# Patient Record
Sex: FEMALE | URBAN - METROPOLITAN AREA
[De-identification: names, ages, dates, MRNs, and addresses within clinical notes are randomized per-mention and may not be internally consistent; named-entity substitution may affect disease eponyms.]

---

## 2019-01-16 VITALS
SYSTOLIC BLOOD PRESSURE: 139 MMHG | DIASTOLIC BLOOD PRESSURE: 76 MMHG | HEIGHT: 64 IN | TEMPERATURE: 96 F | WEIGHT: 232.81 LBS | OXYGEN SATURATION: 96 % | RESPIRATION RATE: 18 BRPM | HEART RATE: 103 BPM

## 2019-01-17 ENCOUNTER — INPATIENT (INPATIENT)
Facility: HOSPITAL | Age: 24
LOS: 0 days | Discharge: HOME | DRG: 743 | End: 2019-01-18
Attending: OBSTETRICS & GYNECOLOGY | Admitting: OBSTETRICS & GYNECOLOGY
Payer: COMMERCIAL

## 2019-01-17 DIAGNOSIS — Z41.9 ENCOUNTER FOR PROCEDURE FOR PURPOSES OTHER THAN REMEDYING HEALTH STATE, UNSPECIFIED: Chronic | ICD-10-CM

## 2019-01-17 RX ORDER — TRAMADOL HYDROCHLORIDE 50 MG/1
25 TABLET ORAL EVERY 6 HOURS
Qty: 0 | Refills: 0 | Status: DISCONTINUED | OUTPATIENT
Start: 2019-01-17 | End: 2019-01-18

## 2019-01-17 RX ORDER — HYDROMORPHONE HYDROCHLORIDE 2 MG/ML
0.5 INJECTION INTRAMUSCULAR; INTRAVENOUS; SUBCUTANEOUS
Qty: 0 | Refills: 0 | Status: DISCONTINUED | OUTPATIENT
Start: 2019-01-17 | End: 2019-01-17

## 2019-01-17 RX ORDER — ENOXAPARIN SODIUM 100 MG/ML
40 INJECTION SUBCUTANEOUS DAILY
Qty: 0 | Refills: 0 | Status: DISCONTINUED | OUTPATIENT
Start: 2019-01-18 | End: 2019-01-18

## 2019-01-17 RX ORDER — ACETAMINOPHEN 500 MG
1000 TABLET ORAL EVERY 8 HOURS
Qty: 0 | Refills: 0 | Status: DISCONTINUED | OUTPATIENT
Start: 2019-01-17 | End: 2019-01-18

## 2019-01-17 RX ORDER — IBUPROFEN 200 MG
600 TABLET ORAL EVERY 6 HOURS
Qty: 0 | Refills: 0 | Status: DISCONTINUED | OUTPATIENT
Start: 2019-01-17 | End: 2019-01-18

## 2019-01-17 RX ORDER — SODIUM CHLORIDE 9 MG/ML
1000 INJECTION, SOLUTION INTRAVENOUS
Qty: 0 | Refills: 0 | Status: DISCONTINUED | OUTPATIENT
Start: 2019-01-17 | End: 2019-01-18

## 2019-01-17 RX ADMIN — Medication 1000 MILLIGRAM(S): at 23:41

## 2019-01-17 RX ADMIN — Medication 1000 MILLIGRAM(S): at 22:44

## 2019-01-17 NOTE — PROGRESS NOTE ADULT - ASSESSMENT
23y Female POD#0 s/p laparoscopic excision of endometriosis and appendectomy, doing well.    1. Neuro/Pain:  Tylenol, Motrin, Tramadol prn  2  CV:   VS per routine  3. Pulm: Encourage ISS  4. GI: ADAT  5. :  Lockett in place, TOV in AM after suspensions removed  6. Heme: AM CBC  7. ID: --  8. DVT ppx: SCDs, lovenox in AM  9. Dispo: Likely POD#1

## 2019-01-17 NOTE — PACU DISCHARGE NOTE - COMMENTS
pt aao x3.  VSS. lap sites to abd with 2x2 to umbilicus and right abd c/d/i; steri strips to suprapubic area and left abd c/d/i. b/l ovarian suspension in place. kwan to bsd.  denies c/o pain.  report given to RN on 5 ex.  pt to go to 547 via bed with transport

## 2019-01-17 NOTE — PROGRESS NOTE ADULT - SUBJECTIVE AND OBJECTIVE BOX
Patient seen at bedside for post op check. Pain is well controlled, 2/10. Patient has a Kawn, b/l ovarian suspensions, and SCDs. Has not yet passed flatus or ambulated. Tolerating clears. Denies headache, dizziness, nausea/vomiting, shortness of breath, palpitations, chest pain, heavy bleeding.     T(C): 36.3 (01-17-19 @ 22:03), Max: 36.3 (01-17-19 @ 22:03)  HR: 113 (01-17-19 @ 22:03) (96 - 122)  BP: 120/71 (01-17-19 @ 22:03) (111/62 - 126/62)  RR: 17 (01-17-19 @ 22:03) (9 - 17)  SpO2: 97% (01-17-19 @ 22:03) (97% - 99%)    Gen: NAD, AO x3  Chest: normal work of breathing, HR sinus tachycardia  Abdomen: soft, nontender, nondistended, no rebound or guarding, + bowel sounds  Incision: dressing clean, dry, intact, b/l ovarian suspensions  : kwan draining clear urine  Extremities: SCDs on      01-17-19 @ 07:01  -  01-17-19 @ 23:52  --------------------------------------------------------  IN: 310 mL / OUT: 265 mL / NET: 45 mL

## 2019-01-18 VITALS
OXYGEN SATURATION: 98 % | HEART RATE: 105 BPM | DIASTOLIC BLOOD PRESSURE: 74 MMHG | RESPIRATION RATE: 18 BRPM | SYSTOLIC BLOOD PRESSURE: 111 MMHG | TEMPERATURE: 99 F

## 2019-01-18 LAB
ANION GAP SERPL CALC-SCNC: 12 MMOL/L — SIGNIFICANT CHANGE UP (ref 5–17)
BUN SERPL-MCNC: 6 MG/DL — LOW (ref 7–23)
CALCIUM SERPL-MCNC: 9 MG/DL — SIGNIFICANT CHANGE UP (ref 8.4–10.5)
CHLORIDE SERPL-SCNC: 104 MMOL/L — SIGNIFICANT CHANGE UP (ref 96–108)
CO2 SERPL-SCNC: 23 MMOL/L — SIGNIFICANT CHANGE UP (ref 22–31)
CREAT SERPL-MCNC: 0.55 MG/DL — SIGNIFICANT CHANGE UP (ref 0.5–1.3)
GLUCOSE SERPL-MCNC: 164 MG/DL — HIGH (ref 70–99)
HCT VFR BLD CALC: 36 % — SIGNIFICANT CHANGE UP (ref 34.5–45)
HGB BLD-MCNC: 11.8 G/DL — SIGNIFICANT CHANGE UP (ref 11.5–15.5)
MCHC RBC-ENTMCNC: 27.6 PG — SIGNIFICANT CHANGE UP (ref 27–34)
MCHC RBC-ENTMCNC: 32.8 G/DL — SIGNIFICANT CHANGE UP (ref 32–36)
MCV RBC AUTO: 84.1 FL — SIGNIFICANT CHANGE UP (ref 80–100)
PLATELET # BLD AUTO: 322 K/UL — SIGNIFICANT CHANGE UP (ref 150–400)
POTASSIUM SERPL-MCNC: 4.1 MMOL/L — SIGNIFICANT CHANGE UP (ref 3.5–5.3)
POTASSIUM SERPL-SCNC: 4.1 MMOL/L — SIGNIFICANT CHANGE UP (ref 3.5–5.3)
RBC # BLD: 4.28 M/UL — SIGNIFICANT CHANGE UP (ref 3.8–5.2)
RBC # FLD: 13.8 % — SIGNIFICANT CHANGE UP (ref 10.3–16.9)
SODIUM SERPL-SCNC: 139 MMOL/L — SIGNIFICANT CHANGE UP (ref 135–145)
WBC # BLD: 16.8 K/UL — HIGH (ref 3.8–10.5)
WBC # FLD AUTO: 16.8 K/UL — HIGH (ref 3.8–10.5)

## 2019-01-18 PROCEDURE — 86850 RBC ANTIBODY SCREEN: CPT

## 2019-01-18 PROCEDURE — 88331 PATH CONSLTJ SURG 1 BLK 1SPC: CPT

## 2019-01-18 PROCEDURE — 85027 COMPLETE CBC AUTOMATED: CPT

## 2019-01-18 PROCEDURE — 88302 TISSUE EXAM BY PATHOLOGIST: CPT

## 2019-01-18 PROCEDURE — 80048 BASIC METABOLIC PNL TOTAL CA: CPT

## 2019-01-18 PROCEDURE — 86901 BLOOD TYPING SEROLOGIC RH(D): CPT

## 2019-01-18 PROCEDURE — 88305 TISSUE EXAM BY PATHOLOGIST: CPT

## 2019-01-18 PROCEDURE — 86900 BLOOD TYPING SEROLOGIC ABO: CPT

## 2019-01-18 PROCEDURE — 36415 COLL VENOUS BLD VENIPUNCTURE: CPT

## 2019-01-18 RX ORDER — ENOXAPARIN SODIUM 100 MG/ML
40 INJECTION SUBCUTANEOUS DAILY
Qty: 0 | Refills: 0 | Status: DISCONTINUED | OUTPATIENT
Start: 2019-01-18 | End: 2019-01-18

## 2019-01-18 RX ORDER — SIMETHICONE 80 MG/1
80 TABLET, CHEWABLE ORAL EVERY 8 HOURS
Qty: 0 | Refills: 0 | Status: DISCONTINUED | OUTPATIENT
Start: 2019-01-18 | End: 2019-01-18

## 2019-01-18 RX ADMIN — ENOXAPARIN SODIUM 40 MILLIGRAM(S): 100 INJECTION SUBCUTANEOUS at 11:34

## 2019-01-18 RX ADMIN — SODIUM CHLORIDE 125 MILLILITER(S): 9 INJECTION, SOLUTION INTRAVENOUS at 03:41

## 2019-01-18 RX ADMIN — Medication 600 MILLIGRAM(S): at 10:00

## 2019-01-18 RX ADMIN — Medication 600 MILLIGRAM(S): at 04:00

## 2019-01-18 RX ADMIN — Medication 600 MILLIGRAM(S): at 03:04

## 2019-01-18 RX ADMIN — Medication 1000 MILLIGRAM(S): at 06:04

## 2019-01-18 RX ADMIN — Medication 1000 MILLIGRAM(S): at 07:04

## 2019-01-18 RX ADMIN — Medication 600 MILLIGRAM(S): at 09:13

## 2019-01-18 NOTE — DISCHARGE NOTE ADULT - CARE PLAN
Principal Discharge DX:	Endometriosis  Goal:	Recovery  Assessment and plan of treatment:	Pelvic rest (nothing in vagina) x6 weeks or unless otherwise instructed by physician. Schedule follow up appointment with Dr. Beal in 1-2 weeks. Go to nearest ED if fever >100.4, severe abdominal pain or heavy vaginal bleeding.   Wound care: Showering is allowed, no baths. Do not scrub incision area. Pat and dry all areas where incisions are.   Take Motrin 600mg every 6 hours or Tylenol 500mg every 8 hours as needed for pain

## 2019-01-18 NOTE — DISCHARGE NOTE ADULT - CARE PROVIDER_API CALL
José Miguel Beal), Obstetrics and Gynecology  [address] [place] [address]  Phone: [phone]  Fax: [phone]

## 2019-01-18 NOTE — PROGRESS NOTE ADULT - ASSESSMENT
23y Female POD#1 s/p laparoscopic excision of endometriosis and appendectomy, doing well.    1. Neuro/Pain:  Tylenol, Motrin, Tramadol prn  2  CV:   tachycardic, per patient she is usually 90s-100s, will f/u and get EKG if patient continues to be tachycardic or becomes symptomatic  3. Pulm: Encourage ISS  4. GI: ADAT  5. :  Lockett in place, TOV later this 0AM after suspensions removed  6. Heme: stable  7. ID: --  8. DVT ppx: SCDs, lovenox in AM  9. Dispo: Likely POD#1

## 2019-01-18 NOTE — PROGRESS NOTE ADULT - SUBJECTIVE AND OBJECTIVE BOX
Patient seen at bedside. Pain is well controlled. Patient has a b/l ovarian suspensions, Lockett, and SCDs. Has not yet passed flatus or ambulated. Tolerating clears. Denies headache, dizziness, nausea/vomiting, shortness of breath, palpitations, heavy bleeding.     T(C): 37.1 (01-18-19 @ 07:21), Max: 37.1 (01-18-19 @ 00:16)  HR: 109 (01-18-19 @ 07:21) (96 - 122)  BP: 114/67 (01-18-19 @ 07:21) (111/56 - 126/62)  RR: 17 (01-18-19 @ 07:21) (9 - 18)  SpO2: 98% (01-18-19 @ 07:21) (97% - 99%)    Gen: NAD, AO x3  Chest: normal work of breathing  Abdomen: soft, nontender, nondistended, no rebound or guarding, ovarian suspensions b/l  Incision: well approximated, no erythema or drainage  Extremities: no calf tenderness or erythema      01-17-19 @ 07:01  -  01-18-19 @ 07:00  --------------------------------------------------------  IN: 1560 mL / OUT: 815 mL / NET: 745 mL                            11.8   16.8  )-----------( 322      ( 18 Jan 2019 07:48 )             36.0   01-18    139  |  104  |  6<L>  ----------------------------<  164<H>  4.1   |  23  |  0.55    Ca    9.0      18 Jan 2019 07:48

## 2019-01-18 NOTE — DISCHARGE NOTE ADULT - HOSPITAL COURSE
22yo s/p laparoscopic endometriosis excision, appendectomy. Pt is hemodynamically stable at time of discharge.

## 2019-01-18 NOTE — DISCHARGE NOTE ADULT - PATIENT PORTAL LINK FT
You can access the [email or web address] Portal, offered by [facility], by registering with the following website: [email or web address]

## 2019-01-18 NOTE — DISCHARGE NOTE ADULT - PLAN OF CARE
Recovery Pelvic rest (nothing in vagina) x6 weeks or unless otherwise instructed by physician. Schedule follow up appointment with Dr. Beal in 1-2 weeks. Go to nearest ED if fever >100.4, severe abdominal pain or heavy vaginal bleeding.   Wound care: Showering is allowed, no baths. Do not scrub incision area. Pat and dry all areas where incisions are.   Take Motrin 600mg every 6 hours or Tylenol 500mg every 8 hours as needed for pain

## 2019-01-25 LAB — SURGICAL PATHOLOGY STUDY: SIGNIFICANT CHANGE UP

## 2019-01-27 DIAGNOSIS — N80.9 ENDOMETRIOSIS, UNSPECIFIED: ICD-10-CM

## 2019-01-27 DIAGNOSIS — N80.0 ENDOMETRIOSIS OF UTERUS: ICD-10-CM
